# Patient Record
Sex: FEMALE | Race: WHITE | NOT HISPANIC OR LATINO | Employment: FULL TIME | URBAN - METROPOLITAN AREA
[De-identification: names, ages, dates, MRNs, and addresses within clinical notes are randomized per-mention and may not be internally consistent; named-entity substitution may affect disease eponyms.]

---

## 2023-02-11 ENCOUNTER — OFFICE VISIT (OUTPATIENT)
Dept: URGENT CARE | Facility: URGENT CARE | Age: 53
End: 2023-02-11
Payer: COMMERCIAL

## 2023-02-11 VITALS — TEMPERATURE: 98.1 F | RESPIRATION RATE: 14 BRPM | HEART RATE: 90 BPM | OXYGEN SATURATION: 100 %

## 2023-02-11 DIAGNOSIS — J02.9 ACUTE PHARYNGITIS, UNSPECIFIED ETIOLOGY: Primary | ICD-10-CM

## 2023-02-11 DIAGNOSIS — R07.0 THROAT PAIN: ICD-10-CM

## 2023-02-11 LAB — DEPRECATED S PYO AG THROAT QL EIA: NEGATIVE

## 2023-02-11 PROCEDURE — 99203 OFFICE O/P NEW LOW 30 MIN: CPT | Performed by: PHYSICIAN ASSISTANT

## 2023-02-11 PROCEDURE — 87651 STREP A DNA AMP PROBE: CPT | Performed by: PHYSICIAN ASSISTANT

## 2023-02-11 NOTE — PROGRESS NOTES
Assessment & Plan        1. Acute pharyngitis, unspecified etiology      2. Throat pain    - Streptococcus A Rapid Screen w/Reflex to PCR - Clinic Collect  - Group A Streptococcus PCR Throat Swab    Throat hygiene. Ibuprofen. Follow up if not improving over the next week.                 Reg Michael PA-C  [facility] CAROLINA Arguello is a 53 year old female who presents to clinic today for the following health issues:  Chief Complaint   Patient presents with     Pharyngitis     Pt is here today with complaints of a ST which started today. Bilateral ear discomfort.   Negative home covid test.      HPI    Sore throat starting today. No exposure to strep. Traveling recently on airplane. Ears hurting. No runny nose. No fever. No headache or body aches. No fatigue. No recent exposure to children. No cough or nasal congestion.           Review of Systems        Objective    Pulse 90   Temp 98.1  F (36.7  C) (Tympanic)   Resp 14   SpO2 100%   Physical Exam

## 2023-02-12 LAB — GROUP A STREP BY PCR: NOT DETECTED
